# Patient Record
Sex: MALE | Race: WHITE | URBAN - NONMETROPOLITAN AREA
[De-identification: names, ages, dates, MRNs, and addresses within clinical notes are randomized per-mention and may not be internally consistent; named-entity substitution may affect disease eponyms.]

---

## 2021-03-30 ENCOUNTER — IMMUNIZATION (OUTPATIENT)
Age: 66
End: 2021-03-30
Payer: MEDICARE

## 2021-03-30 PROCEDURE — 91300 COVID-19, PFIZER VACCINE 30MCG/0.3ML DOSE: CPT | Performed by: FAMILY MEDICINE

## 2021-03-30 PROCEDURE — 0001A COVID-19, PFIZER VACCINE 30MCG/0.3ML DOSE: CPT | Performed by: FAMILY MEDICINE

## 2021-04-20 ENCOUNTER — IMMUNIZATION (OUTPATIENT)
Age: 66
End: 2021-04-20
Payer: MEDICARE

## 2021-04-20 PROCEDURE — 91300 COVID-19, PFIZER VACCINE 30MCG/0.3ML DOSE: CPT | Performed by: FAMILY MEDICINE

## 2021-04-20 PROCEDURE — 0002A PR IMM ADMN SARSCOV2 30MCG/0.3ML DIL RECON 2ND DOSE: CPT | Performed by: FAMILY MEDICINE

## 2021-07-29 ENCOUNTER — HOSPITAL ENCOUNTER (OUTPATIENT)
Dept: GENERAL RADIOLOGY | Facility: HOSPITAL | Age: 66
Discharge: HOME | End: 2021-07-29
Admitting: NURSE PRACTITIONER

## 2021-07-29 ENCOUNTER — TRANSCRIBE ORDERS (OUTPATIENT)
Dept: GENERAL RADIOLOGY | Facility: HOSPITAL | Age: 66
End: 2021-07-29

## 2021-07-29 DIAGNOSIS — M25.511 RIGHT SHOULDER PAIN, UNSPECIFIED CHRONICITY: ICD-10-CM

## 2021-07-29 DIAGNOSIS — M25.511 RIGHT SHOULDER PAIN, UNSPECIFIED CHRONICITY: Primary | ICD-10-CM

## 2021-07-29 PROCEDURE — 73030 X-RAY EXAM OF SHOULDER: CPT

## 2024-02-01 ENCOUNTER — TRANSCRIBE ORDERS (OUTPATIENT)
Dept: ADMINISTRATIVE | Facility: HOSPITAL | Age: 69
End: 2024-02-01
Payer: MEDICARE

## 2024-02-01 ENCOUNTER — HOSPITAL ENCOUNTER (OUTPATIENT)
Dept: ULTRASOUND IMAGING | Facility: HOSPITAL | Age: 69
Discharge: HOME | End: 2024-02-01
Admitting: NURSE PRACTITIONER
Payer: MEDICARE

## 2024-02-01 DIAGNOSIS — N50.811 TESTICULAR PAIN, RIGHT: Primary | ICD-10-CM

## 2024-02-01 DIAGNOSIS — N50.811 TESTICULAR PAIN, RIGHT: ICD-10-CM

## 2024-02-01 PROCEDURE — 76870 US EXAM SCROTUM: CPT

## 2024-02-16 ENCOUNTER — OFFICE VISIT (OUTPATIENT)
Dept: UROLOGY | Facility: CLINIC | Age: 69
End: 2024-02-16
Payer: MEDICARE

## 2024-02-16 VITALS — HEIGHT: 72 IN | WEIGHT: 206.2 LBS | BODY MASS INDEX: 27.93 KG/M2 | TEMPERATURE: 96.6 F

## 2024-02-16 DIAGNOSIS — N50.811 PAIN IN RIGHT TESTICLE: ICD-10-CM

## 2024-02-16 DIAGNOSIS — N50.89 ENLARGED TESTICLE: Primary | ICD-10-CM

## 2024-02-16 DIAGNOSIS — N50.89 SCROTAL SWELLING: ICD-10-CM

## 2024-02-16 RX ORDER — MONTELUKAST SODIUM 10 MG/1
TABLET ORAL EVERY 24 HOURS
COMMUNITY

## 2024-02-16 RX ORDER — NAPROXEN 500 MG/1
500 TABLET ORAL 2 TIMES DAILY WITH MEALS
Qty: 40 TABLET | Refills: 0 | Status: SHIPPED | OUTPATIENT
Start: 2024-02-16 | End: 2024-03-07

## 2024-02-16 RX ORDER — ATORVASTATIN CALCIUM 20 MG/1
TABLET, FILM COATED ORAL EVERY 24 HOURS
COMMUNITY

## 2024-02-16 RX ORDER — ORAL SEMAGLUTIDE 7 MG/1
TABLET ORAL EVERY 24 HOURS
COMMUNITY

## 2024-02-16 RX ORDER — LISINOPRIL 10 MG/1
TABLET ORAL
COMMUNITY

## 2024-02-16 RX ORDER — CETIRIZINE HYDROCHLORIDE 10 MG/1
TABLET ORAL EVERY 24 HOURS
COMMUNITY

## 2024-02-16 RX ORDER — GLIMEPIRIDE 4 MG/1
TABLET ORAL
COMMUNITY

## 2024-02-16 RX ORDER — SULFAMETHOXAZOLE AND TRIMETHOPRIM 800; 160 MG/1; MG/1
1 TABLET ORAL 2 TIMES DAILY
Qty: 42 TABLET | Refills: 0 | Status: SHIPPED | OUTPATIENT
Start: 2024-02-16 | End: 2024-03-08

## 2024-03-05 NOTE — PROGRESS NOTES
Subjective    Mr. Hayes is 68 y.o. male    Chief Complaint: Enlarged Testicle     History of Present Illness  Patient I saw 02/16/2024 as a new patient who was referred for findings on ultrasound as well as scrotal pain and swelling.  He was initially treated with antibiotic I then treated him with further course of antibiotics as well as anti-inflammatory.  Patient denies any further pain or discomfort however he still somewhat concerned about the masses or lumps he feels within his scrotum.  These were found to be epididymal cysts on ultrasound which I reviewed.    The following portions of the patient's history were reviewed and updated as appropriate: allergies, current medications, past family history, past medical history, past social history, past surgical history and problem list.    Review of Systems   Genitourinary:  Positive for scrotal swelling and testicular pain.         Current Outpatient Medications:     atorvastatin (LIPITOR) 20 MG tablet, Daily., Disp: , Rfl:     cetirizine (zyrTEC) 10 MG tablet, Daily., Disp: , Rfl:     glimepiride (AMARYL) 4 MG tablet, TAKE 1 TABLET BY MOUTH EVERY DAY WITH BREAKFAST OR THE FIRST MAIN MEAL OF THE DAY FOR 90 DAYS for 90, Disp: , Rfl:     lisinopril (PRINIVIL,ZESTRIL) 10 MG tablet, TAKE 1 TABLET BY MOUTH EVERY DAY for 90, Disp: , Rfl:     metFORMIN (GLUCOPHAGE) 500 MG tablet, TAKE 1 TABLET BY MOUTH TWICE A DAY FOR 90 DAYS for 90, Disp: , Rfl:     montelukast (SINGULAIR) 10 MG tablet, Daily., Disp: , Rfl:     Rybelsus 7 MG tablet, Daily., Disp: , Rfl:     Past Medical History:   Diagnosis Date    Diabetes mellitus     Hypercholesteremia     Hypertension        Past Surgical History:   Procedure Laterality Date    HEMORRHOIDECTOMY      VASECTOMY         Social History     Socioeconomic History    Marital status:    Tobacco Use    Smoking status: Never    Smokeless tobacco: Former   Vaping Use    Vaping status: Never Used   Substance and Sexual Activity     "Alcohol use: Yes     Comment: occ    Drug use: Never    Sexual activity: Defer       No family history on file.    Objective    Ht 182.9 cm (72\")   Wt 91.6 kg (202 lb)   BMI 27.40 kg/m²     Physical Exam  Constitutional:       Appearance: Normal appearance.   HENT:      Head: Normocephalic and atraumatic.   Pulmonary:      Effort: Pulmonary effort is normal.   Genitourinary:     Comments: Bilateral testicles are normal in appearance without any obvious palpable mass.  Palpable knots consistent with epididymal cyst seen on ultrasound.  Neurological:      Mental Status: He is alert.             No results found for this or any previous visit.  Assessment and Plan    Diagnoses and all orders for this visit:    1. Epididymal cyst (Primary)  -     Cancel: POC Urinalysis Dipstick, Multipro    Repeat examination of the scrotum reveals the epididymal cyst as seen on ultrasound otherwise unremarkable examination.  I reassured the patient that I did not see anything on ultrasound or with my physical exam that would indicate any scrotal cancers.  Should he have any changes such as increasing pain increasing swelling he can return as needed.  I explained that I saw nothing that would indicate or warrant any treatment or surgical intervention.            "

## 2024-03-15 ENCOUNTER — OFFICE VISIT (OUTPATIENT)
Dept: UROLOGY | Facility: CLINIC | Age: 69
End: 2024-03-15
Payer: MEDICARE

## 2024-03-15 VITALS — BODY MASS INDEX: 27.36 KG/M2 | HEIGHT: 72 IN | WEIGHT: 202 LBS

## 2024-03-15 DIAGNOSIS — N50.3 EPIDIDYMAL CYST: Primary | ICD-10-CM

## 2024-07-22 ENCOUNTER — HOSPITAL ENCOUNTER (EMERGENCY)
Age: 69
Discharge: HOME | End: 2024-07-23
Attending: EMERGENCY MEDICINE
Payer: MEDICARE

## 2024-07-22 VITALS
OXYGEN SATURATION: 92 % | WEIGHT: 201 LBS | HEART RATE: 75 BPM | SYSTOLIC BLOOD PRESSURE: 119 MMHG | DIASTOLIC BLOOD PRESSURE: 69 MMHG | TEMPERATURE: 98.3 F | RESPIRATION RATE: 18 BRPM

## 2024-07-22 DIAGNOSIS — S81.811A LACERATION OF RIGHT LOWER EXTREMITY, INITIAL ENCOUNTER: Primary | ICD-10-CM

## 2024-07-22 PROCEDURE — 90471 IMMUNIZATION ADMIN: CPT | Performed by: EMERGENCY MEDICINE

## 2024-07-22 PROCEDURE — 12002 RPR S/N/AX/GEN/TRNK2.6-7.5CM: CPT

## 2024-07-22 PROCEDURE — 2500000003 HC RX 250 WO HCPCS: Performed by: EMERGENCY MEDICINE

## 2024-07-22 PROCEDURE — 99284 EMERGENCY DEPT VISIT MOD MDM: CPT

## 2024-07-22 PROCEDURE — 90715 TDAP VACCINE 7 YRS/> IM: CPT | Performed by: EMERGENCY MEDICINE

## 2024-07-22 PROCEDURE — 6360000002 HC RX W HCPCS: Performed by: EMERGENCY MEDICINE

## 2024-07-22 RX ORDER — LIDOCAINE HYDROCHLORIDE AND EPINEPHRINE 10; 10 MG/ML; UG/ML
20 INJECTION, SOLUTION INFILTRATION; PERINEURAL ONCE
Status: COMPLETED | OUTPATIENT
Start: 2024-07-22 | End: 2024-07-22

## 2024-07-22 RX ORDER — TETANUS AND DIPHTHERIA TOXOIDS ADSORBED 2; 2 [LF]/.5ML; [LF]/.5ML
0.5 INJECTION INTRAMUSCULAR ONCE
Status: DISCONTINUED | OUTPATIENT
Start: 2024-07-22 | End: 2024-07-22

## 2024-07-22 RX ADMIN — LIDOCAINE HYDROCHLORIDE,EPINEPHRINE BITARTRATE 20 ML: 10; .01 INJECTION, SOLUTION INFILTRATION; PERINEURAL at 22:06

## 2024-07-22 RX ADMIN — TETANUS TOXOID, REDUCED DIPHTHERIA TOXOID AND ACELLULAR PERTUSSIS VACCINE, ADSORBED 0.5 ML: 5; 2.5; 8; 8; 2.5 SUSPENSION INTRAMUSCULAR at 21:35

## 2024-07-23 NOTE — DISCHARGE INSTRUCTIONS
Limit bending your knee excessively as this may cause the sutures to pop out of place.  Monitor for signs of infection such as increasing redness, swelling, drainage or pain and return to the emergency department if noted.  Suture should be removed in about 14 days.

## 2024-07-23 NOTE — ED PROVIDER NOTES
[facility] EMERGENCY DEPT  eMERGENCY dEPARTMENT eNCOUnter      Pt Name: Jose Pemberton  MRN: [id number]  Birthdate 1955  Date of evaluation: 7/22/2024  Provider: Michael Henley MD    CHIEF COMPLAINT       Chief Complaint   Patient presents with    Laceration     Leg lac from piece of metal, tetanus not utd         HISTORY OF PRESENT ILLNESS   (Location/Symptom, Timing/Onset,Context/Setting, Quality, Duration, Modifying Factors, Severity)  Note limiting factors.   Jose Pemberton is a 69 y.o. male who presents to the emergency department ***     HPI    NursingNotes were reviewed.    REVIEW OF SYSTEMS    (2-9 systems for level 4, 10 or more for level 5)     Review of Systems         PAST MEDICALHISTORY     Past Medical History:   Diagnosis Date    Diabetes mellitus (HCC)          SURGICAL HISTORY       Past Surgical History:   Procedure Laterality Date    HEMORRHOID SURGERY           CURRENT MEDICATIONS     Previous Medications    No medications on file       ALLERGIES     Patient has no known allergies.    FAMILY HISTORY     No family history on file.       SOCIAL HISTORY       Social History     Socioeconomic History    Marital status:    Tobacco Use    Smoking status: Former     Current packs/day: 4.00     Types: Cigarettes    Smokeless tobacco: Never   Vaping Use    Vaping Use: Never used   Substance and Sexual Activity    Alcohol use: Yes       SCREENINGS    [place] Coma Scale  Eye Opening: Spontaneous  Best Verbal Response: Oriented  Best Motor Response: Obeys commands  [place] Coma Scale Score: 15        PHYSICAL EXAM    (up to 7 for level 4, 8 or more for level 5)     ED Triage Vitals [07/22/24 1922]   BP Temp Temp src Pulse Respirations SpO2 Height Weight - Scale   139/77 98.3 °F (36.8 °C) -- 100 18 98 % -- 91.2 kg (201 lb)       Physical Exam  Musculoskeletal:        Legs:       Comments: 6 cm linear laceration         DIAGNOSTIC RESULTS     EKG: All EKG's areinterpreted by the Emergency Department